# Patient Record
Sex: FEMALE | Race: OTHER | HISPANIC OR LATINO | URBAN - METROPOLITAN AREA
[De-identification: names, ages, dates, MRNs, and addresses within clinical notes are randomized per-mention and may not be internally consistent; named-entity substitution may affect disease eponyms.]

---

## 2020-10-02 ENCOUNTER — EMERGENCY (EMERGENCY)
Facility: HOSPITAL | Age: 35
LOS: 0 days | Discharge: HOME | End: 2020-10-02
Payer: MEDICAID

## 2020-10-02 VITALS
TEMPERATURE: 98 F | RESPIRATION RATE: 17 BRPM | SYSTOLIC BLOOD PRESSURE: 117 MMHG | DIASTOLIC BLOOD PRESSURE: 84 MMHG | WEIGHT: 160.06 LBS | HEART RATE: 80 BPM | OXYGEN SATURATION: 98 % | HEIGHT: 65 IN

## 2020-10-02 DIAGNOSIS — N39.0 URINARY TRACT INFECTION, SITE NOT SPECIFIED: ICD-10-CM

## 2020-10-02 DIAGNOSIS — M79.604 PAIN IN RIGHT LEG: ICD-10-CM

## 2020-10-02 DIAGNOSIS — M79.605 PAIN IN LEFT LEG: ICD-10-CM

## 2020-10-02 LAB
ALBUMIN SERPL ELPH-MCNC: 3.8 G/DL — SIGNIFICANT CHANGE UP (ref 3.3–5)
ALP SERPL-CCNC: 87 U/L — SIGNIFICANT CHANGE UP (ref 40–120)
ALT FLD-CCNC: 20 U/L — SIGNIFICANT CHANGE UP (ref 12–78)
ANION GAP SERPL CALC-SCNC: 4 MMOL/L — LOW (ref 5–17)
APPEARANCE UR: CLEAR — SIGNIFICANT CHANGE UP
AST SERPL-CCNC: 15 U/L — SIGNIFICANT CHANGE UP (ref 15–37)
BACTERIA # UR AUTO: ABNORMAL
BILIRUB SERPL-MCNC: 0.2 MG/DL — SIGNIFICANT CHANGE UP (ref 0.2–1.2)
BILIRUB UR-MCNC: NEGATIVE — SIGNIFICANT CHANGE UP
BUN SERPL-MCNC: 11 MG/DL — SIGNIFICANT CHANGE UP (ref 7–23)
CALCIUM SERPL-MCNC: 8.9 MG/DL — SIGNIFICANT CHANGE UP (ref 8.5–10.1)
CHLORIDE SERPL-SCNC: 106 MMOL/L — SIGNIFICANT CHANGE UP (ref 96–108)
CO2 SERPL-SCNC: 28 MMOL/L — SIGNIFICANT CHANGE UP (ref 22–31)
COLOR SPEC: YELLOW — SIGNIFICANT CHANGE UP
CREAT SERPL-MCNC: 0.65 MG/DL — SIGNIFICANT CHANGE UP (ref 0.5–1.3)
DIFF PNL FLD: NEGATIVE — SIGNIFICANT CHANGE UP
EPI CELLS # UR: SIGNIFICANT CHANGE UP
GLUCOSE SERPL-MCNC: 87 MG/DL — SIGNIFICANT CHANGE UP (ref 70–99)
GLUCOSE UR QL: NEGATIVE MG/DL — SIGNIFICANT CHANGE UP
HCG UR QL: NEGATIVE — SIGNIFICANT CHANGE UP
HCT VFR BLD CALC: 36.4 % — SIGNIFICANT CHANGE UP (ref 34.5–45)
HGB BLD-MCNC: 11.7 G/DL — SIGNIFICANT CHANGE UP (ref 11.5–15.5)
KETONES UR-MCNC: NEGATIVE — SIGNIFICANT CHANGE UP
LEUKOCYTE ESTERASE UR-ACNC: ABNORMAL
LIDOCAIN IGE QN: 197 U/L — SIGNIFICANT CHANGE UP (ref 73–393)
MCHC RBC-ENTMCNC: 26.8 PG — LOW (ref 27–34)
MCHC RBC-ENTMCNC: 32.1 GM/DL — SIGNIFICANT CHANGE UP (ref 32–36)
MCV RBC AUTO: 83.5 FL — SIGNIFICANT CHANGE UP (ref 80–100)
NITRITE UR-MCNC: NEGATIVE — SIGNIFICANT CHANGE UP
NRBC # BLD: 0 /100 WBCS — SIGNIFICANT CHANGE UP (ref 0–0)
PH UR: 6 — SIGNIFICANT CHANGE UP (ref 5–8)
PLATELET # BLD AUTO: 243 K/UL — SIGNIFICANT CHANGE UP (ref 150–400)
POTASSIUM SERPL-MCNC: 4.2 MMOL/L — SIGNIFICANT CHANGE UP (ref 3.5–5.3)
POTASSIUM SERPL-SCNC: 4.2 MMOL/L — SIGNIFICANT CHANGE UP (ref 3.5–5.3)
PROT SERPL-MCNC: 8.3 GM/DL — SIGNIFICANT CHANGE UP (ref 6–8.3)
PROT UR-MCNC: NEGATIVE MG/DL — SIGNIFICANT CHANGE UP
RBC # BLD: 4.36 M/UL — SIGNIFICANT CHANGE UP (ref 3.8–5.2)
RBC # FLD: 13.3 % — SIGNIFICANT CHANGE UP (ref 10.3–14.5)
SODIUM SERPL-SCNC: 138 MMOL/L — SIGNIFICANT CHANGE UP (ref 135–145)
SP GR SPEC: 1.02 — SIGNIFICANT CHANGE UP (ref 1.01–1.02)
UROBILINOGEN FLD QL: 1 MG/DL
WBC # BLD: 7.33 K/UL — SIGNIFICANT CHANGE UP (ref 3.8–10.5)
WBC # FLD AUTO: 7.33 K/UL — SIGNIFICANT CHANGE UP (ref 3.8–10.5)
WBC UR QL: SIGNIFICANT CHANGE UP

## 2020-10-02 PROCEDURE — 76856 US EXAM PELVIC COMPLETE: CPT | Mod: 26

## 2020-10-02 PROCEDURE — 93970 EXTREMITY STUDY: CPT | Mod: 26

## 2020-10-02 PROCEDURE — 99285 EMERGENCY DEPT VISIT HI MDM: CPT

## 2020-10-02 RX ORDER — KETOROLAC TROMETHAMINE 30 MG/ML
30 SYRINGE (ML) INJECTION ONCE
Refills: 0 | Status: DISCONTINUED | OUTPATIENT
Start: 2020-10-02 | End: 2020-10-02

## 2020-10-02 RX ORDER — CIPROFLOXACIN LACTATE 400MG/40ML
500 VIAL (ML) INTRAVENOUS ONCE
Refills: 0 | Status: COMPLETED | OUTPATIENT
Start: 2020-10-02 | End: 2020-10-02

## 2020-10-02 RX ORDER — ACETAMINOPHEN 500 MG
1 TABLET ORAL
Qty: 28 | Refills: 0
Start: 2020-10-02 | End: 2020-10-08

## 2020-10-02 RX ORDER — CIPROFLOXACIN LACTATE 400MG/40ML
1 VIAL (ML) INTRAVENOUS
Qty: 14 | Refills: 0
Start: 2020-10-02 | End: 2020-10-08

## 2020-10-02 RX ADMIN — Medication 500 MILLIGRAM(S): at 18:48

## 2020-10-02 RX ADMIN — Medication 30 MILLIGRAM(S): at 17:07

## 2020-10-02 RX ADMIN — Medication 30 MILLIGRAM(S): at 17:20

## 2020-10-02 NOTE — ED PROVIDER NOTE - PATIENT PORTAL LINK FT
You can access the FollowMyHealth Patient Portal offered by [facility] by registering at the following website: [email or web address]. By joining [employer]’s FollowMyHealth portal, you will also be able to view your health information using other applications (apps) compatible with our system.

## 2020-10-02 NOTE — ED PROVIDER NOTE - MUSCULOSKELETAL, MLM
Spine appears normal, range of motion is not limited, no muscle or joint tenderness +AROM, GOOD PUSLE AND SENSORY

## 2020-10-02 NOTE — ED PROVIDER NOTE - OBJECTIVE STATEMENT
this is a 34 yo f c/o of subpubic pain and b/l leg pain x 2- 3 days. Denies n/v/f/d/loc/ DYSURIA, discharge   [place] intreperter via phone

## 2020-10-02 NOTE — ED PROVIDER NOTE - CARE PLAN
Principal Discharge DX:	UTI (urinary tract infection)  Secondary Diagnosis:	Lower extremity pain, diffuse, unspecified laterality

## 2020-10-02 NOTE — ED PROVIDER NOTE - CLINICAL SUMMARY MEDICAL DECISION MAKING FREE TEXT BOX
rest pain meds and f/u w pmd in 1 week  cipro tylenol   Discussed results and outcome of testing with the patient.  Patient advised to please follow up with their primary care doctor within the next 24-48 hours and return to the Emergency Department for worsening symptoms or any other concerns.  Patient advised that their doctor may call  to follow up on the specific results of the tests performed today in the emergency department.

## 2020-10-02 NOTE — ED ADULT NURSE NOTE - CHIEF COMPLAINT QUOTE
36 y/o female with no pmh. presents to the ED with c/c of lower abdominal pain that is non radiating. For the past 5 days pt has been feeling weak, sleeping more than usual, and fatigued, and yesterday her legs were in pain and today the pain traveled to her lower abdomen. LMP 9/12/2020  & IUD PLACED. Pt denies any vaginal, urinary or chest pain problems.

## 2020-10-02 NOTE — ED ADULT TRIAGE NOTE - CHIEF COMPLAINT QUOTE
34 y/o female with no pmh. presents to the ED with c/c of lower abdominal pain that is non radiating. For the past 5 days pt has been feeling weak, sleeping more than usual, and fatigued, and yesterday her legs were in pain and today the pain traveled to her lower abdomen. LMP 9/12/2020  & IUD PLACED. Pt denies any vaginal, urinary or chest pain problems.

## 2020-10-03 LAB
C TRACH RRNA SPEC QL NAA+PROBE: SIGNIFICANT CHANGE UP
CULTURE RESULTS: SIGNIFICANT CHANGE UP
N GONORRHOEA RRNA SPEC QL NAA+PROBE: SIGNIFICANT CHANGE UP
SPECIMEN SOURCE: SIGNIFICANT CHANGE UP
SPECIMEN SOURCE: SIGNIFICANT CHANGE UP

## 2021-07-18 ENCOUNTER — EMERGENCY (EMERGENCY)
Facility: HOSPITAL | Age: 36
LOS: 0 days | Discharge: HOME | End: 2021-07-19
Attending: EMERGENCY MEDICINE
Payer: COMMERCIAL

## 2021-07-18 VITALS
HEART RATE: 76 BPM | WEIGHT: 164.91 LBS | TEMPERATURE: 98 F | SYSTOLIC BLOOD PRESSURE: 114 MMHG | RESPIRATION RATE: 18 BRPM | DIASTOLIC BLOOD PRESSURE: 79 MMHG | HEIGHT: 65 IN | OXYGEN SATURATION: 100 %

## 2021-07-18 DIAGNOSIS — R11.0 NAUSEA: ICD-10-CM

## 2021-07-18 DIAGNOSIS — K59.00 CONSTIPATION, UNSPECIFIED: ICD-10-CM

## 2021-07-18 DIAGNOSIS — R10.31 RIGHT LOWER QUADRANT PAIN: ICD-10-CM

## 2021-07-18 DIAGNOSIS — Z98.84 BARIATRIC SURGERY STATUS: ICD-10-CM

## 2021-07-18 LAB
ALBUMIN SERPL ELPH-MCNC: 3.5 G/DL — SIGNIFICANT CHANGE UP (ref 3.3–5)
ALP SERPL-CCNC: 89 U/L — SIGNIFICANT CHANGE UP (ref 40–120)
ALT FLD-CCNC: 23 U/L — SIGNIFICANT CHANGE UP (ref 12–78)
ANION GAP SERPL CALC-SCNC: 7 MMOL/L — SIGNIFICANT CHANGE UP (ref 5–17)
APPEARANCE UR: CLEAR — SIGNIFICANT CHANGE UP
AST SERPL-CCNC: 21 U/L — SIGNIFICANT CHANGE UP (ref 15–37)
BACTERIA # UR AUTO: ABNORMAL
BASOPHILS # BLD AUTO: 0.06 K/UL — SIGNIFICANT CHANGE UP (ref 0–0.2)
BASOPHILS NFR BLD AUTO: 0.8 % — SIGNIFICANT CHANGE UP (ref 0–2)
BILIRUB SERPL-MCNC: 0.2 MG/DL — SIGNIFICANT CHANGE UP (ref 0.2–1.2)
BILIRUB UR-MCNC: NEGATIVE — SIGNIFICANT CHANGE UP
BUN SERPL-MCNC: 11 MG/DL — SIGNIFICANT CHANGE UP (ref 7–23)
CALCIUM SERPL-MCNC: 8.6 MG/DL — SIGNIFICANT CHANGE UP (ref 8.5–10.1)
CHLORIDE SERPL-SCNC: 106 MMOL/L — SIGNIFICANT CHANGE UP (ref 96–108)
CO2 SERPL-SCNC: 24 MMOL/L — SIGNIFICANT CHANGE UP (ref 22–31)
COLOR SPEC: YELLOW — SIGNIFICANT CHANGE UP
CREAT SERPL-MCNC: 0.94 MG/DL — SIGNIFICANT CHANGE UP (ref 0.5–1.3)
DIFF PNL FLD: NEGATIVE — SIGNIFICANT CHANGE UP
EOSINOPHIL # BLD AUTO: 0.16 K/UL — SIGNIFICANT CHANGE UP (ref 0–0.5)
EOSINOPHIL NFR BLD AUTO: 2 % — SIGNIFICANT CHANGE UP (ref 0–6)
EPI CELLS # UR: SIGNIFICANT CHANGE UP
GLUCOSE SERPL-MCNC: 99 MG/DL — SIGNIFICANT CHANGE UP (ref 70–99)
GLUCOSE UR QL: NEGATIVE MG/DL — SIGNIFICANT CHANGE UP
HCG SERPL-ACNC: <1 MIU/ML — SIGNIFICANT CHANGE UP
HCT VFR BLD CALC: 35.3 % — SIGNIFICANT CHANGE UP (ref 34.5–45)
HGB BLD-MCNC: 11.6 G/DL — SIGNIFICANT CHANGE UP (ref 11.5–15.5)
IMM GRANULOCYTES NFR BLD AUTO: 0.1 % — SIGNIFICANT CHANGE UP (ref 0–1.5)
KETONES UR-MCNC: NEGATIVE — SIGNIFICANT CHANGE UP
LEUKOCYTE ESTERASE UR-ACNC: ABNORMAL
LIDOCAIN IGE QN: 139 U/L — SIGNIFICANT CHANGE UP (ref 73–393)
LYMPHOCYTES # BLD AUTO: 2.42 K/UL — SIGNIFICANT CHANGE UP (ref 1–3.3)
LYMPHOCYTES # BLD AUTO: 30.3 % — SIGNIFICANT CHANGE UP (ref 13–44)
MCHC RBC-ENTMCNC: 27.7 PG — SIGNIFICANT CHANGE UP (ref 27–34)
MCHC RBC-ENTMCNC: 32.9 GM/DL — SIGNIFICANT CHANGE UP (ref 32–36)
MCV RBC AUTO: 84.2 FL — SIGNIFICANT CHANGE UP (ref 80–100)
MONOCYTES # BLD AUTO: 0.58 K/UL — SIGNIFICANT CHANGE UP (ref 0–0.9)
MONOCYTES NFR BLD AUTO: 7.3 % — SIGNIFICANT CHANGE UP (ref 2–14)
NEUTROPHILS # BLD AUTO: 4.75 K/UL — SIGNIFICANT CHANGE UP (ref 1.8–7.4)
NEUTROPHILS NFR BLD AUTO: 59.5 % — SIGNIFICANT CHANGE UP (ref 43–77)
NITRITE UR-MCNC: NEGATIVE — SIGNIFICANT CHANGE UP
NRBC # BLD: 0 /100 WBCS — SIGNIFICANT CHANGE UP (ref 0–0)
PH UR: 7 — SIGNIFICANT CHANGE UP (ref 5–8)
PLATELET # BLD AUTO: 250 K/UL — SIGNIFICANT CHANGE UP (ref 150–400)
POTASSIUM SERPL-MCNC: 4.2 MMOL/L — SIGNIFICANT CHANGE UP (ref 3.5–5.3)
POTASSIUM SERPL-SCNC: 4.2 MMOL/L — SIGNIFICANT CHANGE UP (ref 3.5–5.3)
PROT SERPL-MCNC: 7.9 GM/DL — SIGNIFICANT CHANGE UP (ref 6–8.3)
PROT UR-MCNC: NEGATIVE MG/DL — SIGNIFICANT CHANGE UP
RBC # BLD: 4.19 M/UL — SIGNIFICANT CHANGE UP (ref 3.8–5.2)
RBC # FLD: 12.9 % — SIGNIFICANT CHANGE UP (ref 10.3–14.5)
SODIUM SERPL-SCNC: 137 MMOL/L — SIGNIFICANT CHANGE UP (ref 135–145)
SP GR SPEC: 1.01 — SIGNIFICANT CHANGE UP (ref 1.01–1.02)
UROBILINOGEN FLD QL: NEGATIVE MG/DL — SIGNIFICANT CHANGE UP
WBC # BLD: 7.98 K/UL — SIGNIFICANT CHANGE UP (ref 3.8–10.5)
WBC # FLD AUTO: 7.98 K/UL — SIGNIFICANT CHANGE UP (ref 3.8–10.5)
WBC UR QL: SIGNIFICANT CHANGE UP

## 2021-07-18 PROCEDURE — 99284 EMERGENCY DEPT VISIT MOD MDM: CPT

## 2021-07-18 PROCEDURE — 74177 CT ABD & PELVIS W/CONTRAST: CPT | Mod: 26,MH

## 2021-07-18 RX ORDER — SODIUM CHLORIDE 9 MG/ML
1000 INJECTION INTRAMUSCULAR; INTRAVENOUS; SUBCUTANEOUS ONCE
Refills: 0 | Status: COMPLETED | OUTPATIENT
Start: 2021-07-18 | End: 2021-07-18

## 2021-07-18 RX ORDER — ONDANSETRON 8 MG/1
4 TABLET, FILM COATED ORAL ONCE
Refills: 0 | Status: COMPLETED | OUTPATIENT
Start: 2021-07-18 | End: 2021-07-18

## 2021-07-18 RX ORDER — ONDANSETRON 8 MG/1
8 TABLET, FILM COATED ORAL ONCE
Refills: 0 | Status: COMPLETED | OUTPATIENT
Start: 2021-07-18 | End: 2021-07-18

## 2021-07-18 RX ORDER — KETOROLAC TROMETHAMINE 30 MG/ML
30 SYRINGE (ML) INJECTION ONCE
Refills: 0 | Status: DISCONTINUED | OUTPATIENT
Start: 2021-07-18 | End: 2021-07-18

## 2021-07-18 RX ORDER — MORPHINE SULFATE 50 MG/1
4 CAPSULE, EXTENDED RELEASE ORAL ONCE
Refills: 0 | Status: DISCONTINUED | OUTPATIENT
Start: 2021-07-18 | End: 2021-07-18

## 2021-07-18 RX ORDER — IOHEXOL 300 MG/ML
30 INJECTION, SOLUTION INTRAVENOUS ONCE
Refills: 0 | Status: COMPLETED | OUTPATIENT
Start: 2021-07-18 | End: 2021-07-18

## 2021-07-18 RX ORDER — MULTIVIT WITH MIN/MFOLATE/K2 340-15/3 G
1 POWDER (GRAM) ORAL ONCE
Refills: 0 | Status: COMPLETED | OUTPATIENT
Start: 2021-07-18 | End: 2021-07-18

## 2021-07-18 RX ADMIN — ONDANSETRON 8 MILLIGRAM(S): 8 TABLET, FILM COATED ORAL at 23:01

## 2021-07-18 RX ADMIN — MORPHINE SULFATE 4 MILLIGRAM(S): 50 CAPSULE, EXTENDED RELEASE ORAL at 20:25

## 2021-07-18 RX ADMIN — Medication 30 MILLIGRAM(S): at 23:31

## 2021-07-18 RX ADMIN — Medication 1 BOTTLE: at 23:29

## 2021-07-18 RX ADMIN — Medication 30 MILLIGRAM(S): at 23:01

## 2021-07-18 RX ADMIN — SODIUM CHLORIDE 1000 MILLILITER(S): 9 INJECTION INTRAMUSCULAR; INTRAVENOUS; SUBCUTANEOUS at 21:43

## 2021-07-18 RX ADMIN — SODIUM CHLORIDE 1000 MILLILITER(S): 9 INJECTION INTRAMUSCULAR; INTRAVENOUS; SUBCUTANEOUS at 20:26

## 2021-07-18 RX ADMIN — ONDANSETRON 4 MILLIGRAM(S): 8 TABLET, FILM COATED ORAL at 20:24

## 2021-07-18 RX ADMIN — IOHEXOL 30 MILLILITER(S): 300 INJECTION, SOLUTION INTRAVENOUS at 20:24

## 2021-07-18 RX ADMIN — Medication 1 ENEMA: at 23:29

## 2021-07-18 RX ADMIN — MORPHINE SULFATE 4 MILLIGRAM(S): 50 CAPSULE, EXTENDED RELEASE ORAL at 20:55

## 2021-07-18 NOTE — ED PROVIDER NOTE - OBJECTIVE STATEMENT
37yo F w/PMH of small fibroid, PSH lap band x4 years ago presents to the ED for RLQ and right sided abd pain x3 weeks mildly. States symptoms worse since last night with nausea. Denies any fever/chills, CP, SOB, hematuria, dysuria, constipation, vomit, vaginal discharge, headache or dizziness. Pt had a normal BM today.     No fever/chills, No photophobia/eye pain/changes in vision, No ear pain/sore throat/dysphagia, No chest pain/palpitations, no SOB/cough/wheeze/stridor, +abdominal pain, +N, no V/D, no dysuria/frequency/discharge, No neck/back pain, no rash, no changes in neurological status/function. 37yo F w/PMH of small fibroid, PSH lap band x4 years ago ([facility]) presents to the ED for RLQ and right sided abd pain x3 weeks mildly. States symptoms worse since last night with nausea. Denies any fever/chills, CP, SOB, hematuria, dysuria, constipation, vomit, vaginal discharge, headache or dizziness. Pt had a normal BM today.     No fever/chills, No photophobia/eye pain/changes in vision, No ear pain/sore throat/dysphagia, No chest pain/palpitations, no SOB/cough/wheeze/stridor, +abdominal pain, +N, no V/D, no dysuria/frequency/discharge, No neck/back pain, no rash, no changes in neurological status/function.

## 2021-07-18 NOTE — ED PROVIDER NOTE - PATIENT PORTAL LINK FT
You can access the FollowMyHealth Patient Portal offered by [facility] by registering at the following website: [email or web address]. By joining [facility] [employer]’s FollowMyHealth portal, you will also be able to view your health information using other applications (apps) compatible with our system.

## 2021-07-18 NOTE — ED ADULT TRIAGE NOTE - CHIEF COMPLAINT QUOTE
pelvic pain has fibroma pain radiated to right flank lmp 7/7 right lower abdominal pain x 3 weeks , has fibroma pain radiated to right flank lmp 7/7

## 2021-07-18 NOTE — ED PROVIDER NOTE - NSFOLLOWUPINSTRUCTIONS_ED_ALL_ED_FT
Follow up with your Bariatric surgeon this week.  Return to the Emergency Department for worsening or persistent symptoms or any other concerns incl. FEVER, persistent vomiting, WORSENING ABDOMINAL PAIN, no bowel movement, chest pain, shortness of breath, dizziness, inability to tolerate oral intake. Continue all previously prescribed medications as directed. You can use Tylenol 650 mg every 4 hours for pain/fever (Max 4g/day of tylenol). Eat plenty of fiber and drink plenty of fluids.

## 2021-07-18 NOTE — ED PROVIDER NOTE - CLINICAL SUMMARY MEDICAL DECISION MAKING FREE TEXT BOX
Given stool much more proximal, likely will need time to pass. Pt feeling improved, abd soft, pt feeling improved, >12 hours after giving narcotic. will dc with miralax, but also advise pt to fu with bariatric surgeon and if still continued pain/constipation/fever, return to ER for eval in 72hours.

## 2021-07-18 NOTE — ED PROVIDER NOTE - PHYSICAL EXAMINATION
Gen: Alert, Well appearing. NAD    Head: NC, AT, PERRL, normal lids/conjunctiva   ENT: patent oropharynx without erythema/exudate, uvula midline  Neck: supple, no tenderness/meningismus  Pulm: Bilateral clear BS, normal resp effort  CV: RRR, no M/R/G, +dist pulses   Abd: soft, + mild rt sided tenderness, ND, +BS, no guarding/rebound tenderness  Mskel: no edema/erythema/cyanosis   Skin: no rash, no bruising  Neuro: AAOx3, no sensory/motor deficits, CN 2-12 intact

## 2021-07-18 NOTE — ED ADULT NURSE NOTE - OBJECTIVE STATEMENT
Pt alert and oriented  presents to the ED for RLQ and right sided abd pain x3 weeks mildly. States symptoms worse since last night with nausea. Denies any fever/chills, CP, SOB, hematuria, dysuria, constipation, vomit, vaginal discharge, headache or dizziness. Pt had a normal BM today.

## 2021-07-19 VITALS
TEMPERATURE: 98 F | OXYGEN SATURATION: 100 % | RESPIRATION RATE: 20 BRPM | DIASTOLIC BLOOD PRESSURE: 64 MMHG | SYSTOLIC BLOOD PRESSURE: 105 MMHG | HEART RATE: 72 BPM

## 2021-07-19 PROBLEM — Z78.9 OTHER SPECIFIED HEALTH STATUS: Chronic | Status: ACTIVE | Noted: 2020-10-02

## 2021-07-19 RX ORDER — POLYETHYLENE GLYCOL 3350 17 G/17G
17 POWDER, FOR SOLUTION ORAL
Qty: 119 | Refills: 0
Start: 2021-07-19 | End: 2021-07-25

## 2021-07-20 LAB
CULTURE RESULTS: SIGNIFICANT CHANGE UP
SPECIMEN SOURCE: SIGNIFICANT CHANGE UP

## 2021-12-15 ENCOUNTER — OUTPATIENT (OUTPATIENT)
Dept: OUTPATIENT SERVICES | Facility: HOSPITAL | Age: 36
LOS: 1 days | End: 2021-12-15

## 2021-12-15 VITALS
RESPIRATION RATE: 16 BRPM | WEIGHT: 188.05 LBS | SYSTOLIC BLOOD PRESSURE: 122 MMHG | TEMPERATURE: 98 F | OXYGEN SATURATION: 98 % | HEIGHT: 64 IN | HEART RATE: 76 BPM | DIASTOLIC BLOOD PRESSURE: 76 MMHG

## 2021-12-15 DIAGNOSIS — Z30.2 ENCOUNTER FOR STERILIZATION: ICD-10-CM

## 2021-12-15 DIAGNOSIS — Z98.890 OTHER SPECIFIED POSTPROCEDURAL STATES: Chronic | ICD-10-CM

## 2021-12-15 DIAGNOSIS — Z90.3 ACQUIRED ABSENCE OF STOMACH [PART OF]: Chronic | ICD-10-CM

## 2021-12-15 LAB
ANION GAP SERPL CALC-SCNC: 9 MMOL/L — SIGNIFICANT CHANGE UP (ref 7–14)
BLD GP AB SCN SERPL QL: NEGATIVE — SIGNIFICANT CHANGE UP
BUN SERPL-MCNC: 11 MG/DL — SIGNIFICANT CHANGE UP (ref 7–23)
CALCIUM SERPL-MCNC: 9 MG/DL — SIGNIFICANT CHANGE UP (ref 8.4–10.5)
CHLORIDE SERPL-SCNC: 105 MMOL/L — SIGNIFICANT CHANGE UP (ref 98–107)
CO2 SERPL-SCNC: 24 MMOL/L — SIGNIFICANT CHANGE UP (ref 22–31)
CREAT SERPL-MCNC: 0.73 MG/DL — SIGNIFICANT CHANGE UP (ref 0.5–1.3)
GLUCOSE SERPL-MCNC: 73 MG/DL — SIGNIFICANT CHANGE UP (ref 70–99)
HCG UR QL: NEGATIVE — SIGNIFICANT CHANGE UP
HCT VFR BLD CALC: 36.3 % — SIGNIFICANT CHANGE UP (ref 34.5–45)
HGB BLD-MCNC: 11.3 G/DL — LOW (ref 11.5–15.5)
MCHC RBC-ENTMCNC: 26.3 PG — LOW (ref 27–34)
MCHC RBC-ENTMCNC: 31.1 GM/DL — LOW (ref 32–36)
MCV RBC AUTO: 84.6 FL — SIGNIFICANT CHANGE UP (ref 80–100)
NRBC # BLD: 0 /100 WBCS — SIGNIFICANT CHANGE UP
NRBC # FLD: 0 K/UL — SIGNIFICANT CHANGE UP
PLATELET # BLD AUTO: 282 K/UL — SIGNIFICANT CHANGE UP (ref 150–400)
POTASSIUM SERPL-MCNC: 4.1 MMOL/L — SIGNIFICANT CHANGE UP (ref 3.5–5.3)
POTASSIUM SERPL-SCNC: 4.1 MMOL/L — SIGNIFICANT CHANGE UP (ref 3.5–5.3)
RBC # BLD: 4.29 M/UL — SIGNIFICANT CHANGE UP (ref 3.8–5.2)
RBC # FLD: 13.2 % — SIGNIFICANT CHANGE UP (ref 10.3–14.5)
RH IG SCN BLD-IMP: POSITIVE — SIGNIFICANT CHANGE UP
SODIUM SERPL-SCNC: 138 MMOL/L — SIGNIFICANT CHANGE UP (ref 135–145)
WBC # BLD: 7.18 K/UL — SIGNIFICANT CHANGE UP (ref 3.8–10.5)
WBC # FLD AUTO: 7.18 K/UL — SIGNIFICANT CHANGE UP (ref 3.8–10.5)

## 2021-12-15 RX ORDER — SODIUM CHLORIDE 9 MG/ML
1000 INJECTION, SOLUTION INTRAVENOUS
Refills: 0 | Status: DISCONTINUED | OUTPATIENT
Start: 2021-12-29 | End: 2022-01-12

## 2021-12-15 NOTE — H&P PST ADULT - PROBLEM SELECTOR PLAN 1
Preop instructions provided including npo status, Hibiclens wash for infection control and GI prophylaxis. Pt aware to stop any NSAIDS, OTC herbals or MVI on 12/22/21. Verbalized understanding. BW done, pending results. DVT prophylaxis as per primary team.  Aware to f/u on covid testing prior to sx as per pst protocol and will make an appt. Phone number provided since does not have an appt.

## 2021-12-15 NOTE — H&P PST ADULT - ASSESSMENT
DX: encounter for sterilization and evaluated for a scheduled laparoscopic b/l salpingectomy on 12/29/21.

## 2021-12-15 NOTE — H&P PST ADULT - HEALTH CARE MAINTENANCE
flu vaccine: 11/15/2021  Denies current covid S&S or in the past, test neg when done. Pt denies history of travel outside the country and outside [place] and denies COVID19 positive contacts within the last 14 days.

## 2021-12-15 NOTE — H&P PST ADULT - NSANTHOSAYNRD_GEN_A_CORE
Denies sleep studies done in the past/No. NISHA screening performed.  STOP BANG Legend: 0-2 = LOW Risk; 3-4 = INTERMEDIATE Risk; 5-8 = HIGH Risk

## 2021-12-15 NOTE — H&P PST ADULT - VISION (WITH CORRECTIVE LENSES IF THE PATIENT USUALLY WEARS THEM):
wears glasses as needed to drive/Partially impaired: cannot see medication labels or newsprint, but can see obstacles in path, and the surrounding layout; can count fingers at arm's length

## 2021-12-15 NOTE — H&P PST ADULT - HISTORY OF PRESENT ILLNESS
37 y/o F presents to PST w/ a preop dx of encounter for sterilization and to be evaluated for a scheduled laparoscopic b/l salpingectomy on 12/29/21. pt states has 3 kids, wants to perform permanent contraception so was evaluated by her GUYN and b/l salpingectomy recommended at this time.

## 2021-12-27 PROBLEM — Z00.00 ENCOUNTER FOR PREVENTIVE HEALTH EXAMINATION: Status: ACTIVE | Noted: 2021-12-27

## 2021-12-28 ENCOUNTER — TRANSCRIPTION ENCOUNTER (OUTPATIENT)
Age: 36
End: 2021-12-28

## 2021-12-28 NOTE — ASU PATIENT PROFILE, ADULT - ANESTHESIA, PREVIOUS REACTION, PROFILE
none Rhombic Flap Text: The defect edges were debeveled with a #15 scalpel blade.  Given the location of the defect and the proximity to free margins a rhombic flap was deemed most appropriate.  Using a sterile surgical marker, an appropriate rhombic flap was drawn incorporating the defect.    The area thus outlined was incised deep to adipose tissue with a #15 scalpel blade.  The skin margins were undermined to an appropriate distance in all directions utilizing iris scissors.

## 2021-12-28 NOTE — ASU PATIENT PROFILE, ADULT - FALL HARM RISK - UNIVERSAL INTERVENTIONS
Bed in lowest position, wheels locked, appropriate side rails in place/Call bell, personal items and telephone in reach/Instruct patient to call for assistance before getting out of bed or chair/Non-slip footwear when patient is out of bed/[place] to call system/Physically safe environment - no spills, clutter or unnecessary equipment/Purposeful Proactive Rounding/Room/bathroom lighting operational, light cord in reach

## 2021-12-29 ENCOUNTER — RESULT REVIEW (OUTPATIENT)
Age: 36
End: 2021-12-29

## 2021-12-29 ENCOUNTER — OUTPATIENT (OUTPATIENT)
Dept: OUTPATIENT SERVICES | Facility: HOSPITAL | Age: 36
LOS: 1 days | Discharge: HOME | End: 2021-12-29
Payer: COMMERCIAL

## 2021-12-29 VITALS
DIASTOLIC BLOOD PRESSURE: 73 MMHG | WEIGHT: 187.39 LBS | SYSTOLIC BLOOD PRESSURE: 129 MMHG | RESPIRATION RATE: 16 BRPM | HEIGHT: 64 IN | HEART RATE: 85 BPM | TEMPERATURE: 98 F | OXYGEN SATURATION: 99 %

## 2021-12-29 VITALS
SYSTOLIC BLOOD PRESSURE: 117 MMHG | OXYGEN SATURATION: 99 % | RESPIRATION RATE: 18 BRPM | TEMPERATURE: 99 F | HEART RATE: 88 BPM | DIASTOLIC BLOOD PRESSURE: 71 MMHG

## 2021-12-29 DIAGNOSIS — Z30.2 ENCOUNTER FOR STERILIZATION: ICD-10-CM

## 2021-12-29 DIAGNOSIS — Z90.3 ACQUIRED ABSENCE OF STOMACH [PART OF]: Chronic | ICD-10-CM

## 2021-12-29 DIAGNOSIS — Z98.890 OTHER SPECIFIED POSTPROCEDURAL STATES: Chronic | ICD-10-CM

## 2021-12-29 LAB — HCG UR QL: NEGATIVE — SIGNIFICANT CHANGE UP

## 2021-12-29 PROCEDURE — 88302 TISSUE EXAM BY PATHOLOGIST: CPT | Mod: 26

## 2021-12-29 RX ORDER — OXYCODONE HYDROCHLORIDE 5 MG/1
5 TABLET ORAL ONCE
Refills: 0 | Status: DISCONTINUED | OUTPATIENT
Start: 2021-12-29 | End: 2021-12-29

## 2021-12-29 RX ORDER — OXYCODONE HYDROCHLORIDE 5 MG/1
1 TABLET ORAL
Qty: 6 | Refills: 0
Start: 2021-12-29

## 2021-12-29 RX ORDER — FENTANYL CITRATE 50 UG/ML
25 INJECTION INTRAVENOUS
Refills: 0 | Status: DISCONTINUED | OUTPATIENT
Start: 2021-12-29 | End: 2021-12-29

## 2021-12-29 RX ORDER — ACETAMINOPHEN 500 MG
3 TABLET ORAL
Qty: 0 | Refills: 0 | DISCHARGE

## 2021-12-29 RX ORDER — IBUPROFEN 200 MG
1 TABLET ORAL
Qty: 0 | Refills: 0 | DISCHARGE

## 2021-12-29 RX ORDER — SODIUM CHLORIDE 9 MG/ML
1000 INJECTION, SOLUTION INTRAVENOUS
Refills: 0 | Status: DISCONTINUED | OUTPATIENT
Start: 2021-12-29 | End: 2022-01-12

## 2021-12-29 RX ORDER — ONDANSETRON 8 MG/1
4 TABLET, FILM COATED ORAL ONCE
Refills: 0 | Status: DISCONTINUED | OUTPATIENT
Start: 2021-12-29 | End: 2022-01-12

## 2021-12-29 RX ADMIN — OXYCODONE HYDROCHLORIDE 5 MILLIGRAM(S): 5 TABLET ORAL at 17:58

## 2021-12-29 RX ADMIN — SODIUM CHLORIDE 125 MILLILITER(S): 9 INJECTION, SOLUTION INTRAVENOUS at 16:18

## 2021-12-29 NOTE — ASU DISCHARGE PLAN (ADULT/PEDIATRIC) - FOLLOW UP APPOINTMENTS
may also call Recovery Room (PACU) 24/7 @ [phone]/[facility]

## 2021-12-29 NOTE — ASU DISCHARGE PLAN (ADULT/PEDIATRIC) - ASU DC SPECIAL INSTRUCTIONSFT
Regular diet as tolerated, regular activity as tolerated, no heavy lifting for six weeks.  Nothing per vagina: no intercourse, tampons or douching.  Call your provider if you experience fevers, chills, worsening abdominal pain, inability to urinate or worsening vaginal bleeding.  Follow up with Dr. Colin in 2 weeks.

## 2021-12-29 NOTE — ASU DISCHARGE PLAN (ADULT/PEDIATRIC) - NURSING INSTRUCTIONS
You received IV Tylenol for pain management at __3pm_. Please DO NOT take any Tylenol (Acetaminophen) containing products, such as Vicodin, Percocet, Excedrin, and cold medications for the next 6 hours (until __9_ PM). DO NOT TAKE MORE THAN 3000 MG OF TYLENOL in a 24 hour period.

## 2021-12-29 NOTE — ASU DISCHARGE PLAN (ADULT/PEDIATRIC) - CALL YOUR DOCTOR IF YOU HAVE ANY OF THE FOLLOWING:
Bleeding that does not stop/Swelling that gets worse/Pain not relieved by Medications/Fever greater than (need to indicate Fahrenheit or Celsius)/Wound/Surgical Site with redness, or foul smelling discharge or pus/Nausea and vomiting that does not stop/Unable to urinate Bleeding that does not stop/Swelling that gets worse/Pain not relieved by Medications/Fever greater than (need to indicate Fahrenheit or Celsius)/Wound/Surgical Site with redness, or foul smelling discharge or pus/Nausea and vomiting that does not stop/Unable to urinate/Inability to tolerate liquids or foods/Increased irritability or sluggishness

## 2021-12-29 NOTE — CHART NOTE - NSCHARTNOTEFT_GEN_A_CORE
Patient seen and examined at bedside, recently post-op s/p LSC BS. No acute complaints at this time. Denies CP, SOB, N/V, fevers, and chills.    Vital Signs Last 24 Hours  T(C): 37 (12-29-21 @ 18:20), Max: 37.1 (12-29-21 @ 17:05)  HR: 88 (12-29-21 @ 18:20) (80 - 98)  BP: 117/71 (12-29-21 @ 18:20) (113/69 - 129/73)  RR: 18 (12-29-21 @ 18:20) (15 - 22)  SpO2: 99% (12-29-21 @ 18:20) (92% - 100%)    I&O's Summary    29 Dec 2021 07:01  -  29 Dec 2021 18:49  --------------------------------------------------------  IN: 925 mL / OUT: 0 mL / NET: 925 mL        Physical Exam:  General: NAD  CV: NR, RR, S1, S2, no M/R/G  Lungs: CTA-B  Abdomen: Soft, appropriately tender, non-distended,   Incision: 3 port sites present with op site dressing on. c/d/i   Ext: No pain or swelling    Labs:      MEDICATIONS  (STANDING):  lactated ringers. 1000 milliLiter(s) (30 mL/Hr) IV Continuous <Continuous>  lactated ringers. 1000 milliLiter(s) (125 mL/Hr) IV Continuous <Continuous>    MEDICATIONS  (PRN):  fentaNYL    Injectable 25 MICROGram(s) IV Push every 5 minutes PRN Moderate Pain (4 - 6)  ondansetron Injectable 4 milliGRAM(s) IV Push once PRN Nausea and/or Vomiting    37y/o s/p LSC BS recovering well in acute post-operative state.    Neuro: Pain controlled. PO pain meds.  CV: Hemodynamically stable  Pulm: Encourage oob/ambulation.  GI: Regular Diet  : voiding spontaneously  Heme: SCDs  ID: afebrile  Endo: no active issues  Dispo: continue routine post-op care, stable for d/c        Lizbet Varun, PGY1   d/w GYN Team

## 2021-12-29 NOTE — ASU DISCHARGE PLAN (ADULT/PEDIATRIC) - CARE PROVIDER_API CALL
Velia Colin)  Obstetrics and Gynecology  [address] [place] [id number]  Phone: [phone]  Fax: [phone]  Follow Up Time:

## 2021-12-29 NOTE — ASU DISCHARGE PLAN (ADULT/PEDIATRIC) - NS MD DC FALL RISK RISK
For information on Fall & Injury Prevention, visit: [email or web address] OR  [email or web address] OR  https://www.cdc.gov/steadi/patient.html

## 2022-01-04 LAB — SURGICAL PATHOLOGY STUDY: SIGNIFICANT CHANGE UP

## 2022-03-18 NOTE — ASU PATIENT PROFILE, ADULT - WILL THE PATIENT ACCEPT THE PFIZER COVID-19 VACCINE IF ELIGIBLE AND IT IS AVAILABLE?
Gustavo Hernandez is a 56 y.o. female who is seen as a consult at the request of Dr. Burak Morley for Consult (POSSIBLE FISTULA, recommended by Dr. Burak Morley.).      HPI:    The patient reports she has been having air coming out her vagina since 10/2021. She states she was speaking with gastroenterologist Dr. Woodard about the problem and he thought she might have some bacteria buildup in her intestines. She adds Dr. Woodard prescribed her an antibiotic. The patient reports after a couple weeks she was not any better and started noticing and odor coming out of her vagina. She states she contacted Dr. Morley who was concerned it was a fistula. The patient adds Dr. Woodard prescribed another antibiotic which seemed to take care of things. She reports Dr. Morley thought she should still keep her appointment. She denies she is having any drainage.     The patient states she has to empty her ileostomy a lot. She reports she always have diarrhea. The patient states a couple weeks ago she was feeling kind of sick with a stomach bug. She adds she took some anti diarrhea medication but when she takes the medication it stops so much that is makes her sick, then she has nausea. The patient has tried a natural fiber previously. She adds she tries to stay hydrated.       Past Medical History:   Diagnosis Date   • Abnormal cortisol level 03/2019   • Abnormal serum thyroxine (T4) level 07/2020   • Actinic keratosis     FOLLOWED BY DR. DEEDEE LUTHER   • HASMUKH (acute kidney injury) ([facility]) 06/2021   • Allergic rhinitis    • Anxiety    • Asthma    • Chest pain 04/2021   • Chronic sinusitis    • Complex cyst of uterine adnexa 07/2002   • COPD (chronic obstructive pulmonary disease) ([facility])    • COVID-19 virus infection 01/14/2022   • Crohn's disease ([facility])    • Dehydration 06/2015   • Depression    • Diplopia 07/2018   • Esophageal reflux    • Excessive menstruation 2002   • Exertional chest pain 01/18/2019    SEEN AT [facility]   • Gastroenteritis  2015    SEEN AT [facility] ER   • GERD (gastroesophageal reflux disease)    • Hair loss 2019   • Herpes    • History of MRSA infection    • History of transfusion    • Hypertension    • Hypertensive urgency 2018    SEEN AT [facility] ER   • Hypertriglyceridemia    • Hypokalemia 2018   • Hypomagnesemia 2018   • Ileostomy present ([facility])    • Impacted cerumen    • Influenza 2016   • Influenza A 2019   • Insomnia 10/2018   • Intestinal malabsorption    • Iron deficiency anemia    • Kidney stones    • Leukocytosis 2015   • Macrocytosis    • Microhematuria 2013    FOLLOWED BY DR. CR YEE   • Migraines    • Pagophagia 2019   • Peptic ulceration    • Peritoneal adhesions    • Proctosigmoiditis 2001    REFRACTORY, ADMITTED TO [facility]   • SBO (small bowel obstruction) ([facility]) 10/07/2019    ADMITTED TO [place]   • SBO (small bowel obstruction) ([facility]) 2014    ADMITTED TO [facility]   • SBO (small bowel obstruction) ([facility]) 2003   • SBO (small bowel obstruction) ([facility]) 2019    SEEN AT [facility] ER   • Seborrheic keratosis     FOLLOWED BY DR. DEEDEE LUTHER   • Toxic colitis 1999    ADMITTED TO [facility]   • Ulcerative colitis ([facility])    • Vagabond's disease 2019   • Vaginal atrophy    • Vulvovaginal candidiasis 2021       Past Surgical History:   Procedure Laterality Date   • APPENDECTOMY N/A 2001    DR. MARY KATE NEWELL  AT [facility]   •  SECTION N/A 1988   •  SECTION N/A 1993   • COLECTOMY TOTAL N/A 2001    Total abdominal colectomy with J-pouch with diverting loop ileostomy AND APPENDECTOMY, DR. MARY KATE NEWELL AT [facility]   • COLONOSCOPY N/A 2001    PROCTOSIGMOIDITIS, PSEUDOPOLYP AT 20 CM, DR. JOHNNY HOLCOMB AT [facility]   • CYSTOSCOPY RETROGRADE PYELOGRAM Left 2013    Dr. Cr Yee AT [facility]   • D & C  HYSTEROSCOPY N/A 03/03/2013    FOCAL CYSTIC ATROPHY, ECC BENIGN, DR. VISH CROWDER  AT [facility]   • ENDOSCOPY N/A 06/28/2019    SMALL BOWEL ENTEROSCOPY, EDEMA IN STOMACH, CHRONIC GASTRITIS, PATH: MILD REACTIVE/CHEMICAL GASTROPATHY, DR. RUBEN MANCUSO AT [facility]   • FLEXIBLE SIGMOIDOSCOPY N/A 02/06/1997    (+) IBD, PATH: ACTIVE COLITIS, DR. JOHNNY HOLCOMB AT [facility]   • HYSTERECTOMY N/A 08/28/2003    WITH BSO, DR. VISH CROWDER AT [facility]   • ILEOSCOPY N/A 01/10/2002    WITH ANAL DILATION, (+) SEVERE POUCHITIS, DR. MARY KATE NEWELL  AT [facility]   • ILEOSTOMY CLOSURE N/A 07/30/2001    DR. MARY KATE NEWELL AT [facility]   • ILEOSTOMY REVISION N/A 01/25/2002    J POUCH REMOVAL WITH PERMANENT END ILEOSTOMYBURKE ILEOSTOMY CREATION, DR. MARY KATE NEWELL  AT [facility]   • LAPAROSCOPIC CHOLECYSTECTOMY N/A 09/03/2010    w/ cholangiogram and adhesiolysis-Dr. Mary Kate Avila AT [facility]   • LAPAROSCOPIC LYSIS OF ADHESIONS N/A 08/28/2003    EXPLORATORY LAPAROSCOPY WITH LYSIS, DR. MARY KATE NEWELL  AT [facility]   • PELVIC LAPAROSCOPY N/A 01/31/2019    EXPLORATORY LAPAROSCOPY WITH PELVIC MASS EXCISION, DR. DOC BECERRIL AT [place]   • PERIPHERALLY INSERTED CENTRAL CATHETER INSERTION Right 02/15/2019    RIGHT SUBCLAVIAN, DR. CHARLES MARAVILLA AT [facility]   • POUCHOSCOPY N/A 07/26/2001    POUCHOGRAM, WNL, DR. MARY KATE NEWELL  AT [facility]   • SALPINGO OOPHORECTOMY Bilateral 08/28/2003    DR. VISH CROWDER  AT [facility]   • UMBILICAL HERNIA REPAIR N/A    • UPPER GASTROINTESTINAL ENDOSCOPY N/A 09/01/2011    Normal esophagus, normal stomach, erythematous duodenopathy-Dr. aJi Khan   • UPPER GASTROINTESTINAL ENDOSCOPY N/A 05/20/2010    Normal esophagus, normal stomach, normal duodneum-Dr. Jai Khan AT [facility]       Social History:   reports that she quit smoking about 35 years ago. Her smoking use included  cigarettes. She smoked 0.25 packs per day. She quit smokeless tobacco use about 13 years ago.  Her smokeless tobacco use included chew. She reports that she does not drink alcohol and does not use drugs.      Marriage status:     Family History   Problem Relation Age of Onset   • Mental illness Mother    • Heart disease Mother    • Diabetes Mother    • Hypertension Mother    • Arthritis Mother         RHEUMATOID   • Colon polyps Mother    • Rheum arthritis Mother    • Heart disease Father    • Hypertension Father    • Stroke Father    • Osteoporosis Maternal Aunt    • Colon cancer Paternal Aunt    • Cancer Paternal Aunt    • Osteoporosis Maternal Grandmother    • Cancer Paternal Grandmother    • Colon cancer Paternal Grandmother         unsure of age   • Breast cancer Neg Hx    • Ovarian cancer Neg Hx    • Uterine cancer Neg Hx    • Deep vein thrombosis Neg Hx    • Pulmonary embolism Neg Hx    • Melanoma Neg Hx    • Prostate cancer Neg Hx          Current Outpatient Medications:   •  acyclovir (ZOVIRAX) 400 MG tablet, TAKE ONE TABLET BY MOUTH TWICE DAILY FOR FIVE DAYS FOR outbreak AS NEEDED. CALL [facility] FOR REFILL, Disp: , Rfl:   •  albuterol sulfate  (90 Base) MCG/ACT inhaler, Inhale 2 puffs., Disp: , Rfl:   •  ALPRAZolam (NIRAVAM) 1 MG disintegrating tablet, 1 mg 3 (Three) Times a Day As Needed., Disp: , Rfl:   •  ALPRAZolam (XANAX) 1 MG tablet, Take 1 mg by mouth 2 (Two) Times a Day As Needed for Anxiety., Disp: , Rfl:   •  cetirizine (zyrTEC) 10 MG tablet, Take 10 mg by mouth Daily., Disp: , Rfl:   •  Cetirizine HCl (ZYRTEC ALLERGY PO), Take 10 mg by mouth Daily., Disp: , Rfl:   •  conjugated estrogens (Premarin) 0.625 MG/GM vaginal cream, .5 gms pv 3 times a week, Disp: 30 g, Rfl: 0  •  eletriptan (RELPAX) 40 MG tablet, Take one tablet by mouth at onset of migraine. May repeat in 2 hours if needed. Do not exceed 2 tablets in 24 hours., Disp: 9 tablet, Rfl: 5  •  Emgality 120 MG/ML auto-injector  pen, , Disp: , Rfl:   •  EPINEPHrine (EPIPEN) 0.3 MG/0.3ML solution auto-injector injection, , Disp: , Rfl:   •  estradiol (ESTRACE) 1 MG tablet, , Disp: , Rfl:   •  estradiol (ESTRACE) 1 MG tablet, Take 1 mg by mouth Daily., Disp: , Rfl:   •  fluconazole (Diflucan) 150 MG tablet, Take 1 tablet by mouth Every 3 (Three) Days., Disp: 3 tablet, Rfl: 0  •  fluticasone (FLONASE) 50 MCG/ACT nasal spray, 2 sprays into each nostril Daily., Disp: , Rfl:   •  lisinopril (PRINIVIL,ZESTRIL) 10 MG tablet, TAKE 1 TABLET BY MOUTH ONCE DAILY FOR 30 DAYS, Disp: , Rfl:   •  Multiple Vitamins-Minerals (MULTIVITAMIN PO), Take 1 tablet by mouth Daily., Disp: , Rfl:   •  omeprazole (PriLOSEC) 40 MG capsule, Take 40 mg by mouth Daily., Disp: , Rfl:   •  ondansetron ODT (ZOFRAN-ODT) 8 MG disintegrating tablet, TAKE ONE TABLET BY MOUTH AND allow TO dissolve ONCE DAILY AS NEEDED. CALL [facility] FOR REFILL, Disp: , Rfl:   •  rizatriptan (MAXALT) 10 MG tablet, Take 1 tablet by mouth 1 (One) Time As Needed for Migraine. May repeat in 2 hours if needed, Disp: 18 tablet, Rfl: 5  •  simethicone (MYLICON) 80 MG chewable tablet, , Disp: , Rfl:   •  valACYclovir (VALTREX) 500 MG tablet, , Disp: , Rfl:   •  valACYclovir (VALTREX) 500 MG tablet, Take 500 mg by mouth Daily., Disp: , Rfl:     Allergy  Iodine, Metoclopramide, and Propoxyphene    Review of Systems   Constitutional: Negative for decreased appetite and weight gain.   HENT: Negative for congestion, hearing loss and hoarse voice.    Eyes: Negative for blurred vision, discharge and visual disturbance.   Cardiovascular: Negative for chest pain, cyanosis and leg swelling.   Respiratory: Negative for cough, shortness of breath, sleep disturbances due to breathing and snoring.    Endocrine: Negative for cold intolerance and heat intolerance.   Hematologic/Lymphatic: Positive for bleeding problem. Does not bruise/bleed easily.   Skin: Negative for itching, poor wound healing and skin cancer.    Musculoskeletal: Negative for arthritis, back pain, joint pain and joint swelling.   Gastrointestinal: Negative for abdominal pain, change in bowel habit, bowel incontinence and constipation.   Genitourinary: Negative for bladder incontinence, dysuria and hematuria.   Neurological: Positive for headaches. Negative for brief paralysis, excessive daytime sleepiness, dizziness, focal weakness, light-headedness and weakness.   Psychiatric/Behavioral: Negative for altered mental status and hallucinations. The patient does not have insomnia.    Allergic/Immunologic: Positive for environmental allergies. Negative for HIV exposure and persistent infections.   All other systems reviewed and are negative.      Vitals:    03/18/22 1040   BP: 112/84   Pulse: 85   Temp: 97.8 °F (36.6 °C)   SpO2: 99%     Body mass index is 24.31 kg/m².    Physical Exam  Exam conducted with a chaperone present.   Constitutional:       General: She is not in acute distress.     Appearance: She is well-developed.   HENT:      Head: Normocephalic and atraumatic.      Nose: Nose normal.   Eyes:      Conjunctiva/sclera: Conjunctivae normal.      Pupils: Pupils are equal, round, and reactive to light.   Neck:      Trachea: No tracheal deviation.   Pulmonary:      Effort: Pulmonary effort is normal. No respiratory distress.      Breath sounds: Normal breath sounds.   Abdominal:      General: Bowel sounds are normal. There is no distension.      Palpations: Abdomen is soft.   Musculoskeletal:         General: No deformity. Normal range of motion.      Cervical back: Normal range of motion.   Skin:     General: Skin is warm and dry.   Neurological:      Mental Status: She is alert and oriented to person, place, and time.      Cranial Nerves: No cranial nerve deficit.      Coordination: Coordination normal.      Gait: Gait normal.   Psychiatric:         Behavior: Behavior normal.         Judgment: Judgment normal.         Review of Medical Record:The  patient had a CT scan no contrast on 04/27/2020 for abdominal pain. There was no inflammation , bowels were normal. History of of colectomy with ileostomy and hysterectomy and cholecystectomy. The patient saw Dr. Burak Morley and complained about flatus coming out of her vagina. The last time patient was seen by me was in 2014. She has a total abdominal colectomy and J pouch made in 2001, but it did not heal well and she was converted to an APR in 2002.    Assessment:  1. Flatus, vaginalis        Plan:  We will order a CT scan with contrast and contact patient with results. Benadryl and prednisone prescription sent to pharmacy for IV contrast allergy.  I will call her with the results..        Transcribed from ambient dictation for Yulia Dominguez MD by Marion Meléndez.  03/18/22   14:56 EDT    Patient verbalized consent to the visit recording.  I have personally performed the services described in this document as transcribed by the above individual, and it is both accurate and complete.  Yulia Dominguez MD  3/21/2022  12:59 EDT         Not applicable

## 2023-02-07 NOTE — ED PROVIDER NOTE - PROGRESS NOTE DETAILS
Michael Pt passed a lot of flatus and a lot of water with some improvement in pain. abd much softer and without tenderness. CT noted without acute pathology.  CT noted with gastric sleeve, and clarified with pt, and states it was gastric sleeve. Noted with significant stool in RT side, though more water distally. will give mag citrate and enema and reassess.

## 2024-04-15 NOTE — ED ADULT NURSE NOTE - NS TRANSFER PATIENT BELONGINGS
Clothing Bill For Wasted Drug (Kenalog)?: no How Many Mls Were Removed From The 40 Mg/Ml (10ml) Vial When Preparing The Injectable Solution?: 0 Detail Level: Detailed Kenalog Preparation: Kenalog Medical Necessity Clause: This procedure was medically necessary because the lesions that were treated were: Total Volume (Ccs): 0.3 Validate Note Data When Using Inventory: Yes Lot # For Kenalog (Optional): [id number] Kenalog Type Of Vial: Multiple Dose Administered By (Optional):  Consent: The risks of atrophy and pigmentary change were reviewed with the patient. Show Inventory Tab: Hide Expiration Date For Kenalog (Optional): 01/2025 Ndc# For Kenalog Only: 7722-6866-12 Concentration Of Kenalog Solution Injected (Mg/Ml): 5.0

## 2024-06-07 NOTE — ASU PATIENT PROFILE, ADULT - PRO MENTAL HEALTH SX RECENT
[facility] Appointment Referral Outreach Attempt    Type of visit to be used: PHARMACIST VISIT NEW (1782) or NEW PATIENT VIRTUAL VISIT (8236)   Duration: 60-minute duration  Provider: any [facility] pharmacist  Location: [facility] and/or [place]  Reason for visit: diabetes management   needed? no   No same day visits unless approved by clinician directly       Instructions for  if IN-OFFICE visit type:   -Please indicate if medication / device training is needed (specify name) in the Appt Notes     Instructions for  if VIRTUAL visit type:  -Please indicate if patient prefers VIDEO or TELEPHONE in the Appt Notes  -Please note that medication / device teaching are not appropriate for virtual visits     Instructions for patient:   -Please bring all current medications   -Please bring any devices / supplies / self-test readings if needed       
none

## 2024-08-27 NOTE — ASU PREOP CHECKLIST - BMI (KG/M2)
32.2 [NI] : Genitourinary  [Nl] : Endocrine [Cough] : cough [Shortness of Breath] : shortness of breath

## 2025-01-08 NOTE — H&P PST ADULT - VENOUS THROMBOEMBOLISM SCORE
Subjective   Patient ID: Macarena Adler is a 67 y.o. female who presents for Annual Exam (Pt has no new concerns today).  She is here for annual & FU visit. Reports she is doing well, no acute illness.  Her IO /82, taking amlodipine 10 mg daily. She is a smoker, 1/2 ppd x many yrs, not ready to quit. Has diabetes with recent A1c at 6.2 (01/3/25), taking Jardiance 25 mg daily.   Reports she is no longer taking paxil as its not helping and she feels okay currently.  Reports other chronic problems are stable as listed below and taking all medications as prescribed w/o issues, request refills.     HPI   #HM stuffs  Screening tests:  - Mammogram (age 40-74): UTD, 10/22/24, following with GYN   - Bone scan (age 65 & older): UTD () & wnl.   - Colonoscopy (age 45-75): 2023, rec repeat 3 yrs    - Lipid profile: UTD & at goal   - Low dose CT chest (age 50-80): recently (2023) had CT chest angio     Primary prevention:  - Flu shot: UTD   - RSV (age > 60): rec to get at local pharmacy   - COVID vaccines: UTD  - Tdap shot: declined   - Shingles shot (age >50): UTD  - Prevnar 20: UTD   - Statin (age 40-65 or high risk): taking     Counseling:   - ETOH (age>18):    - Smokin/2 ppd x many yrs     Review of Systems   Constitutional:  Negative for chills, fatigue, fever and unexpected weight change.   HENT: Negative.     Respiratory:  Negative for cough, shortness of breath and wheezing.    Cardiovascular:  Negative for chest pain, palpitations and leg swelling.   Gastrointestinal:  Negative for abdominal pain, constipation, diarrhea, nausea and vomiting.   Musculoskeletal: Negative.    Skin:  Negative for color change and rash.   Neurological:  Negative for dizziness and headaches.   Psychiatric/Behavioral:  Negative for behavioral problems and confusion.        Objective   /82 (BP Location: Left arm, Patient Position: Sitting, BP Cuff Size: Large adult)   Pulse 103   Temp 35.9 °C (96.6 °F) (Temporal)    "Resp 16   Ht 1.854 m (6' 1\")   Wt 136 kg (299 lb)   SpO2 96%   BMI 39.45 kg/m²     Physical Exam  Vitals and nursing note reviewed.   Constitutional:       Appearance: Normal appearance. She is obese.   HENT:      Right Ear: Tympanic membrane normal.      Left Ear: Tympanic membrane normal.   Eyes:      Extraocular Movements: Extraocular movements intact.      Pupils: Pupils are equal, round, and reactive to light.   Cardiovascular:      Rate and Rhythm: Normal rate and regular rhythm.      Pulses: Normal pulses.      Heart sounds: Normal heart sounds.   Pulmonary:      Effort: Pulmonary effort is normal. No respiratory distress.      Breath sounds: Normal breath sounds.   Abdominal:      General: Abdomen is flat. Bowel sounds are normal.      Palpations: Abdomen is soft.   Musculoskeletal:         General: Normal range of motion.   Neurological:      General: No focal deficit present.      Mental Status: She is alert and oriented to person, place, and time.      Cranial Nerves: No cranial nerve deficit.      Sensory: No sensory deficit.      Motor: No weakness.   Psychiatric:         Mood and Affect: Mood normal.         Behavior: Behavior normal.         Assessment/Plan   She is here for annual and follow-up visit.  Patient is doing well, no acute illness and is clinically and vitally stable.    Her BP remains well-controlled, continue current meds.  Follow DASH diet and highly encouraged to quit/cut down on smoking cigarettes.  Declined resources.  Diabetes remains at goal with A1c at 6.2, 1/3/2025; continue Jardiance 25 mg daily as usual.  Follow low glycemic food.  Will repeat A1c and urine albumin at next visit.  Provided diabetic supplies as requested.   Other chronic problems are stable; continue all medications as usual. Rx refilled.  Other plan as follows    # HM visit   - Discussed ACP with pt; will work on POA/living will paper work   - Immunization per emr: Up-to-date on MOST  - Age appropriate " screenings as listed above summary      Problem List Items Addressed This Visit             ICD-10-CM    Benign essential hypertension I10    Relevant Medications    amLODIPine (Norvasc) 10 mg tablet    Current smoker F17.200    Hyperlipidemia, mixed E78.2    Relevant Medications    pravastatin (Pravachol) 40 mg tablet    Diabetes mellitus, type 2 (Multi) E11.9    Relevant Medications    alcohol swabs (BD Alcohol Swabs) pads, medicated    empagliflozin (Jardiance) 25 mg    lancets (Unilet Lancet) 33 gauge misc    pravastatin (Pravachol) 40 mg tablet    blood sugar diagnostic (True Metrix Glucose Test Strip) strip    Vitamin D deficiency E55.9    Relevant Medications    cholecalciferol (Vitamin D-3) 25 MCG (1000 UT) capsule    Other juvenile arthritis, 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    Adilson Kelly MD    [facility]        3